# Patient Record
Sex: MALE | Race: WHITE | NOT HISPANIC OR LATINO
[De-identification: names, ages, dates, MRNs, and addresses within clinical notes are randomized per-mention and may not be internally consistent; named-entity substitution may affect disease eponyms.]

---

## 2017-06-15 PROBLEM — Z00.00 ENCOUNTER FOR PREVENTIVE HEALTH EXAMINATION: Status: ACTIVE | Noted: 2017-06-15

## 2017-08-11 ENCOUNTER — APPOINTMENT (OUTPATIENT)
Dept: CARDIOLOGY | Facility: CLINIC | Age: 57
End: 2017-08-11
Payer: COMMERCIAL

## 2017-08-11 ENCOUNTER — NON-APPOINTMENT (OUTPATIENT)
Age: 57
End: 2017-08-11

## 2017-08-11 VITALS
HEART RATE: 57 BPM | OXYGEN SATURATION: 98 % | BODY MASS INDEX: 35.01 KG/M2 | HEIGHT: 68 IN | WEIGHT: 231 LBS | SYSTOLIC BLOOD PRESSURE: 144 MMHG | DIASTOLIC BLOOD PRESSURE: 87 MMHG

## 2017-08-11 DIAGNOSIS — E66.9 OBESITY, UNSPECIFIED: ICD-10-CM

## 2017-08-11 DIAGNOSIS — E78.5 HYPERLIPIDEMIA, UNSPECIFIED: ICD-10-CM

## 2017-08-11 DIAGNOSIS — I10 ESSENTIAL (PRIMARY) HYPERTENSION: ICD-10-CM

## 2017-08-11 DIAGNOSIS — R73.03 PREDIABETES.: ICD-10-CM

## 2017-08-11 PROCEDURE — 99204 OFFICE O/P NEW MOD 45 MIN: CPT

## 2017-08-11 PROCEDURE — 93000 ELECTROCARDIOGRAM COMPLETE: CPT

## 2017-08-11 RX ORDER — ASPIRIN 81 MG/1
81 TABLET, CHEWABLE ORAL
Qty: 90 | Refills: 3 | Status: ACTIVE | COMMUNITY
Start: 2017-08-11

## 2017-08-11 RX ORDER — LISINOPRIL 20 MG/1
20 TABLET ORAL
Qty: 90 | Refills: 0 | Status: ACTIVE | COMMUNITY
Start: 2017-04-11

## 2017-08-11 RX ORDER — TESTOSTERONE 30 MG/1.5ML
30 SOLUTION TOPICAL
Qty: 540 | Refills: 0 | Status: ACTIVE | COMMUNITY
Start: 2017-08-03

## 2017-08-11 RX ORDER — SIMVASTATIN 20 MG/1
20 TABLET, FILM COATED ORAL
Qty: 90 | Refills: 0 | Status: ACTIVE | COMMUNITY
Start: 2017-04-11

## 2017-09-15 ENCOUNTER — APPOINTMENT (OUTPATIENT)
Dept: CARDIOLOGY | Facility: CLINIC | Age: 57
End: 2017-09-15

## 2019-03-05 ENCOUNTER — NON-APPOINTMENT (OUTPATIENT)
Age: 59
End: 2019-03-05

## 2019-03-05 ENCOUNTER — APPOINTMENT (OUTPATIENT)
Dept: CARDIOLOGY | Facility: CLINIC | Age: 59
End: 2019-03-05
Payer: COMMERCIAL

## 2019-03-05 VITALS
HEART RATE: 56 BPM | BODY MASS INDEX: 35.61 KG/M2 | DIASTOLIC BLOOD PRESSURE: 90 MMHG | OXYGEN SATURATION: 99 % | SYSTOLIC BLOOD PRESSURE: 144 MMHG | WEIGHT: 235 LBS | HEIGHT: 68 IN

## 2019-03-05 DIAGNOSIS — R00.2 PALPITATIONS: ICD-10-CM

## 2019-03-05 PROCEDURE — 99214 OFFICE O/P EST MOD 30 MIN: CPT

## 2019-03-05 PROCEDURE — 93000 ELECTROCARDIOGRAM COMPLETE: CPT

## 2019-03-11 ENCOUNTER — APPOINTMENT (OUTPATIENT)
Dept: CARDIOLOGY | Facility: CLINIC | Age: 59
End: 2019-03-11
Payer: COMMERCIAL

## 2019-03-11 PROCEDURE — 93306 TTE W/DOPPLER COMPLETE: CPT

## 2019-04-05 ENCOUNTER — APPOINTMENT (OUTPATIENT)
Dept: CARDIOLOGY | Facility: CLINIC | Age: 59
End: 2019-04-05
Payer: COMMERCIAL

## 2019-04-05 PROCEDURE — 93015 CV STRESS TEST SUPVJ I&R: CPT

## 2021-02-18 ENCOUNTER — TRANSCRIPTION ENCOUNTER (OUTPATIENT)
Age: 61
End: 2021-02-18

## 2021-02-20 ENCOUNTER — OUTPATIENT (OUTPATIENT)
Dept: OUTPATIENT SERVICES | Facility: HOSPITAL | Age: 61
LOS: 1 days | End: 2021-02-20
Payer: COMMERCIAL

## 2021-02-20 ENCOUNTER — APPOINTMENT (OUTPATIENT)
Dept: DISASTER EMERGENCY | Facility: HOSPITAL | Age: 61
End: 2021-02-20

## 2021-02-20 VITALS
SYSTOLIC BLOOD PRESSURE: 161 MMHG | OXYGEN SATURATION: 98 % | HEIGHT: 68 IN | DIASTOLIC BLOOD PRESSURE: 97 MMHG | RESPIRATION RATE: 18 BRPM | WEIGHT: 205.03 LBS | HEART RATE: 70 BPM | TEMPERATURE: 98 F

## 2021-02-20 VITALS
RESPIRATION RATE: 18 BRPM | OXYGEN SATURATION: 98 % | TEMPERATURE: 98 F | DIASTOLIC BLOOD PRESSURE: 98 MMHG | HEART RATE: 59 BPM | SYSTOLIC BLOOD PRESSURE: 156 MMHG

## 2021-02-20 DIAGNOSIS — U07.1 COVID-19: ICD-10-CM

## 2021-02-20 PROCEDURE — M0239: CPT

## 2021-02-20 RX ORDER — SODIUM CHLORIDE 9 MG/ML
250 INJECTION INTRAMUSCULAR; INTRAVENOUS; SUBCUTANEOUS
Refills: 0 | Status: DISCONTINUED | OUTPATIENT
Start: 2021-02-20 | End: 2021-03-07

## 2021-02-20 RX ORDER — BAMLANIVIMAB 35 MG/ML
700 INJECTION, SOLUTION INTRAVENOUS ONCE
Refills: 0 | Status: COMPLETED | OUTPATIENT
Start: 2021-02-20 | End: 2021-02-20

## 2021-02-20 RX ADMIN — BAMLANIVIMAB 270 MILLIGRAM(S): 35 INJECTION, SOLUTION INTRAVENOUS at 12:37

## 2021-02-20 RX ADMIN — SODIUM CHLORIDE 25 MILLILITER(S): 9 INJECTION INTRAMUSCULAR; INTRAVENOUS; SUBCUTANEOUS at 12:37

## 2021-02-20 NOTE — CHART NOTE - NSCHARTNOTEFT_GEN_A_CORE
CC: Monoclonal Antibody Infusion/COVID 19 Positive  61y Male  and recent dx of COVID 19+ who presents today for elective Bamlanivimab infusion.  exam/findings:  T(C): 36.8 (02-20-21 @ 12:11), Max: 36.8 (02-20-21 @ 12:11)  HR: 70 (02-20-21 @ 12:11) (70 - 70)  BP: 161/97 (02-20-21 @ 12:11) (161/97 - 161/97)  RR: 18 (02-20-21 @ 12:11) (18 - 18)  SpO2: 98% (02-20-21 @ 12:11) (98% - 98%)      PE:   Appearance: NAD	  HEENT:   Normal oral mucosa,   Lymphatic: No lymphadenopathy  Cardiovascular: Normal S1 S2, No JVD, No murmurs, No edema  Respiratory: Lungs clear to auscultation	  Gastrointestinal:  Soft, Non-tender, + BS	  Skin: warm and dry, no rash or urticaria   Neurologic: Non-focal  Extremities: Normal range of motion,    ASSESSMENT:  Pt is a 60 yo male who have symptoms of cough,malaise  Covid +on 2/14  referred by Dr. Barbara Paul who presents to infusion center for elective Monoclonal antibody infusion (Bamlanivimab).    Symptoms/ Criteria: Cough,malaise  Risk Profile includes: Age >55, HTN    PLAN:  - infusion procedure explained to patient   - Consent for monoclonal antibody infusion obtained I have reviewed the Bamlanivimab Emergency Use Authorization (EUA) and I have provided the patient or patient's caregiver with the following information:      1. FDA has authorized emergency use of Bamlanivimab, which is not an FDA-approved biological product.  2. The patient or patient's caregiver has the option to accept or refuse administration of Bamlanivimab.  3. The significant known and potential risks and benefits of Bamlanivimab and the extent to which such risks and benefits are unknown.  4. Information on available alternative treatments were discussed and recommended to visit https://www.zpehu48xyhyiljqeqmcboggfiy.nih.gov/ for further understanding    - Risk & benefits discussed/all questions answered  - infuse Bamlanivimab (700mg) IV over one hour  - observe patient for one hour post infusion, and then if stable discharge home with oupt follow up as planned by PMD. CC: Monoclonal Antibody Infusion/COVID 19 Positive  61y Male  and recent dx of COVID 19+ who presents today for elective Bamlanivimab infusion.  exam/findings:  T(C): 36.8 (02-20-21 @ 12:11), Max: 36.8 (02-20-21 @ 12:11)  HR: 70 (02-20-21 @ 12:11) (70 - 70)  BP: 161/97 (02-20-21 @ 12:11) (161/97 - 161/97)  RR: 18 (02-20-21 @ 12:11) (18 - 18)  SpO2: 98% (02-20-21 @ 12:11) (98% - 98%)      PE:   Appearance: NAD	  HEENT:   Normal oral mucosa,   Lymphatic: No lymphadenopathy  Cardiovascular: Normal S1 S2, No JVD, No murmurs, No edema  Respiratory: Lungs clear to auscultation	  Gastrointestinal:  Soft, Non-tender, + BS	  Skin: warm and dry, no rash or urticaria   Neurologic: Non-focal  Extremities: Normal range of motion,    ASSESSMENT:  Pt is a 62 yo male who have symptoms of cough,malaise  Covid +on 2/14  referred by Dr. Brabara Paul who presents to infusion center for elective Monoclonal antibody infusion (Bamlanivimab).    Symptoms/ Criteria: Cough,malaise  Risk Profile includes: Age >55, HTN    PLAN:  - infusion procedure explained to patient   - Consent for monoclonal antibody infusion obtained I have reviewed the Bamlanivimab Emergency Use Authorization (EUA) and I have provided the patient or patient's caregiver with the following information:      1. FDA has authorized emergency use of Bamlanivimab, which is not an FDA-approved biological product.  2. The patient or patient's caregiver has the option to accept or refuse administration of Bamlanivimab.  3. The significant known and potential risks and benefits of Bamlanivimab and the extent to which such risks and benefits are unknown.  4. Information on available alternative treatments were discussed and recommended to visit https://www.kwhjp51cygkxfemyddbkhgtyph.nih.gov/ for further understanding    - Risk & benefits discussed/all questions answered  - infuse Bamlanivimab (700mg) IV over one hour  - observe patient for one hour post infusion, and then if stable discharge home with oupt follow up as planned by PMD.    ADDENDUM    Patient is cleared for discharge at 14:50 pm. He tolerated full infusion well and denies complaints of chest pain, shortness of breath, nausea/vomiting, dizziness, or palpitations. Vital signs stable upon discharge. Patient is medically stable and cleared for discharge home. Discharge instructions provided to patient with fact sheet included. Patient was instructed to continue to self-isolate and use  infection control measures (e.g., wear mask, isolate, social distance, avoid sharing personal items, clean and disinfect “high touch” surfaces, and frequent handwashing) according to CDC guidelines. Patient instructed to follow up with PMD as needed.

## 2021-02-21 ENCOUNTER — TRANSCRIPTION ENCOUNTER (OUTPATIENT)
Age: 61
End: 2021-02-21

## 2022-05-11 ENCOUNTER — APPOINTMENT (OUTPATIENT)
Dept: ORTHOPEDIC SURGERY | Facility: CLINIC | Age: 62
End: 2022-05-11
Payer: COMMERCIAL

## 2022-05-11 VITALS — HEIGHT: 68 IN | WEIGHT: 235 LBS | BODY MASS INDEX: 35.61 KG/M2

## 2022-05-11 PROCEDURE — J3490M: CUSTOM

## 2022-05-11 PROCEDURE — 20610 DRAIN/INJ JOINT/BURSA W/O US: CPT | Mod: LT

## 2022-05-11 PROCEDURE — 99214 OFFICE O/P EST MOD 30 MIN: CPT | Mod: 25

## 2022-05-11 NOTE — ASSESSMENT
[FreeTextEntry1] : ***s/p L knee PMM/PLM on 5/21/21 **\par (**has some OA**)\par \par  exacb undeyrlgin OA\par \par - We discussed their diagnosis and treatment options at length including surgical and non-surgical options.\par - We will continue conservative treatment with activity modification, PT, icing, weight loss, and anti-inflammatory medications.\par - The patient was provided with a PT prescription to work on ROM, hip ER/abductors strengthening, quad/hamstring stretches and strengthening, and other exercises \par - The patient was advised to let pain guide the gradual advancement of activities. \par - We also discussed the possible of a corticosteroid injection in order to help decrease inflammation and pain so that they can perform better therapy.\par - The risks, benefits, and alternatives to corticosteroid injection were reviewed with the patient and they wished to proceed with this treatment course. \par - Follow up as needed in 6 weeks to re-evaluate, if no improvement we spoke about possibility of viscosupplementation injections - will resubmit for auth\par

## 2022-05-11 NOTE — HISTORY OF PRESENT ILLNESS
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 His pain is medial and anterior and worse with WB activites and better at rest. Assoc with some buckling and clicking along with swelling in knee. He has been seeing Dr. Vázquez and tx with ice, nsaids, CSI with mild reief. \par \par 12/2/20 - since I saw he did PT with some relief4 but then pain has returned and worse with acitivty\par 1/13/21 - doing PT but still having some catching and pain both medial and lateral along with achiness\par 3/10/21 - cont to have medial pain and [id number] in left knee, some lateral pain\par \par ***s/p L knee PMM/PLM on 5/21/21 (**has some OA**)***\par \par 5/25/21 - post op visit, doing well, pain well controlled\par 7/13/21 - doing PT with improvement\par 11/2/21 - was doing well after PT but recently knee pain worsening with [id number]\par 12/7/21 - wants to discuss visco, had CSI (11/2)\par 12/14/21 - left euflexxa #2\par 12/21/21 - left euflexxa #3\par \par 3/8/22 - was doing well wityh visco , did PT but then fell onto knee during snowstorm.\par \par 5/11/2022- returns to care with some continued left knee pain medially, anteiror and deep worse with activity

## 2022-05-11 NOTE — IMAGING
[de-identified] : \par RIGHT KNEE\par Inspection:  minimal effusion\par Palpation: medial facet of the patella tenderness \par Knee Range of Motion:  0-140\par Strength: 5/5 Quadriceps strength, 5/5 Hamstring strength, 4/5 Hip Abductor strength\par Neurological: light touch is intact throughout\par Ligament Stability and Special Tests: \par McMurrays: neg\par Lachman: neg\par Pivot Shift: neg\par Posterior Drawer: neg\par Valgus: neg\par Varus: neg\par Patella Apprehension: neg\par Patella Maltracking: neg\par \par \par LEFT KNEE\par Inspection:  mild effusion\par Palpation: medial joint line tenderness, anterior tenderness\par Knee Range of Motion:  3-125 \par Strength: 5/5 Quadriceps strength, 5/5 Hamstring strength\par Neurological: light touch is intact throughout\par Ligament Stability and Special Tests: \par McMurrays: neg\par Lachman: neg\par Pivot Shift: neg\par Posterior Drawer: neg\par Valgus: neg\par Varus: neg\par Patella Apprehension: neg\par Patella Maltracking: neg\par

## 2022-05-11 NOTE — REASON FOR VISIT
[FreeTextEntry2] : left knee
Essential hypertension

## 2022-05-13 RX ORDER — HYALURONATE SODIUM 20 MG/2 ML
20 SYRINGE (ML) INTRAARTICULAR
Qty: 3 | Refills: 0 | Status: ACTIVE | COMMUNITY
Start: 2022-05-13 | End: 1900-01-01

## 2022-06-28 ENCOUNTER — APPOINTMENT (OUTPATIENT)
Dept: ORTHOPEDIC SURGERY | Facility: CLINIC | Age: 62
End: 2022-06-28
Payer: COMMERCIAL

## 2022-06-28 VITALS — HEIGHT: 68 IN | BODY MASS INDEX: 35.61 KG/M2 | WEIGHT: 235 LBS

## 2022-06-28 PROCEDURE — 99214 OFFICE O/P EST MOD 30 MIN: CPT | Mod: 25

## 2022-06-28 PROCEDURE — 20610 DRAIN/INJ JOINT/BURSA W/O US: CPT

## 2022-06-28 NOTE — ASSESSMENT
[FreeTextEntry1] : ***s/p L knee PMM/PLM on 5/21/21 **\par (**has some OA**)\par \par  exacb undeyrlgin OA\par \par - We discussed their diagnosis and treatment options at length including surgical and non-surgical options.\par - We will continue conservative treatment with activity modification, PT, icing, weight loss, and anti-inflammatory medications.\par - The patient was provided with a PT prescription to work on ROM, hip ER/abductors strengthening, quad/hamstring stretches and strengthening, and other exercises \par - The patient was advised to let pain guide the gradual advancement of activities. \par - we spoke about possibility of viscosupplementation injections and they want to proceed\par - left knee euflexxa #1 given, raysa well\par

## 2022-06-28 NOTE — HISTORY OF PRESENT ILLNESS
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 His pain is medial and anterior and worse with WB activites and better at rest. Assoc with some buckling and clicking along with swelling in knee. He has been seeing Dr. Vázquez and tx with ice, nsaids, CSI with mild reief. \par \par 12/2/20 - since I saw he did PT with some relief4 but then pain has returned and worse with acitivty\par 1/13/21 - doing PT but still having some catching and pain both medial and lateral along with achiness\par 3/10/21 - cont to have medial pain and [id number] in left knee, some lateral pain\par \par ***s/p L knee PMM/PLM on 5/21/21 (**has some OA**)***\par \par 5/25/21 - post op visit, doing well, pain well controlled\par 7/13/21 - doing PT with improvement\par 11/2/21 - was doing well after PT but recently knee pain worsening with [id number]\par 12/7/21 - wants to discuss visco, had CSI (11/2)\par 12/14/21 - left euflexxa #2\par 12/21/21 - left euflexxa #3\par \par 3/8/22 - was doing well wityh visco , did PT but then fell onto knee during snowstorm.\par 5/11/2022- returns to care with some continued left knee pain medially, anteiror and deep worse with activity\par 6/28/22 - had CSI (5/11), wants to discuss poss visco

## 2022-06-28 NOTE — PROCEDURE
[FreeTextEntry3] : \par Injection Procedure Note:\par \par The risks, benefits, and alternatives to viscosupplementation injection were reviewed with the patient. Risks outlined include but are not limited to infection, sepsis, bleeding, scarring, temporary increase in pain, syncopal episode, failure to resolve symptoms, symptoms recurrence, allergic reaction, flare reaction, pseudoseptic reaction.  Patient understood the risks and asked to proceed with this treatment course.\par \par Patient Identification\par Name/: Verbal with patient and/or family\par \par Procedure Verification:\par Procedure confirmed with patient or family/designee\par Consent for procedure: Verbal Consent Given\par Relevant documentation completed, reviewed, and signed\par Clinical indications for procedure confirmed\par \par Time-out with all members of procedure team immediately prior to procedure:\par Correct patient identified. Agreement on procedure. Correct side and site.\par \par \par KNEE INJECTION (Vis[facility]) - LEFT\par After verbal consent and identification of the correct patient and correct site, the left knee were prepped using alcohol swabs and betadine. This was allowed time to air dry. \par An injection of EUFLEXXA 2ml  #1 \par was injected into the knee using a sterile 22G needle after ethyl chloride spray for skin anesthesia.  The patient tolerated the procedure well. After-care instructions were provided and included instructions to ice the area and to call if redness, pain, or fever develop.\par

## 2022-06-28 NOTE — IMAGING
[de-identified] : \par RIGHT KNEE\par Inspection:  minimal effusion\par Palpation: medial facet of the patella tenderness \par Knee Range of Motion:  0-140\par Strength: 5/5 Quadriceps strength, 5/5 Hamstring strength, 4/5 Hip Abductor strength\par Neurological: light touch is intact throughout\par Ligament Stability and Special Tests: \par McMurrays: neg\par Lachman: neg\par Pivot Shift: neg\par Posterior Drawer: neg\par Valgus: neg\par Varus: neg\par Patella Apprehension: neg\par Patella Maltracking: neg\par \par \par LEFT KNEE\par Inspection:  mild effusion\par Palpation: medial joint line tenderness, anterior tenderness\par Knee Range of Motion:  3-125 \par Strength: 5/5 Quadriceps strength, 5/5 Hamstring strength\par Neurological: light touch is intact throughout\par Ligament Stability and Special Tests: \par McMurrays: neg\par Lachman: neg\par Pivot Shift: neg\par Posterior Drawer: neg\par Valgus: neg\par Varus: neg\par Patella Apprehension: neg\par Patella Maltracking: neg\par

## 2022-07-05 ENCOUNTER — APPOINTMENT (OUTPATIENT)
Dept: ORTHOPEDIC SURGERY | Facility: CLINIC | Age: 62
End: 2022-07-05
Payer: COMMERCIAL

## 2022-07-05 PROCEDURE — 20610 DRAIN/INJ JOINT/BURSA W/O US: CPT | Mod: LT

## 2022-07-05 PROCEDURE — 99212 OFFICE O/P EST SF 10 MIN: CPT | Mod: 25

## 2022-07-05 NOTE — HISTORY OF PRESENT ILLNESS
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 His pain is medial and anterior and worse with WB activites and better at rest. Assoc with some buckling and clicking along with swelling in knee. He has been seeing Dr. Vázquez and tx with ice, nsaids, CSI with mild reief. \par \par 12/2/20 - since I saw he did PT with some relief4 but then pain has returned and worse with acitivty\par 1/13/21 - doing PT but still having some catching and pain both medial and lateral along with achiness\par 3/10/21 - cont to have medial pain and [id number] in left knee, some lateral pain\par \par ***s/p L knee PMM/PLM on 5/21/21 (**has some OA**)***\par \par 5/25/21 - post op visit, doing well, pain well controlled\par 7/13/21 - doing PT with improvement\par 11/2/21 - was doing well after PT but recently knee pain worsening with [id number]\par 12/7/21 - wants to discuss visco, had CSI (11/2)\par 12/14/21 - left euflexxa #2\par 12/21/21 - left euflexxa #3\par \par 3/8/22 - was doing well wityh visco , did PT but then fell onto knee during snowstorm.\par 5/11/2022- returns to care with some continued left knee pain medially, anteiror and deep worse with activity\par 6/28/22 - had CSI (5/11), wants to discuss poss visco\par 7/5/22 - left euflexxa #2

## 2022-07-05 NOTE — PROCEDURE
[FreeTextEntry3] : \par Injection Procedure Note:\par \par The risks, benefits, and alternatives to viscosupplementation injection were reviewed with the patient. Risks outlined include but are not limited to infection, sepsis, bleeding, scarring, temporary increase in pain, syncopal episode, failure to resolve symptoms, symptoms recurrence, allergic reaction, flare reaction, pseudoseptic reaction.  Patient understood the risks and asked to proceed with this treatment course.\par \par Patient Identification\par Name/: Verbal with patient and/or family\par \par Procedure Verification:\par Procedure confirmed with patient or family/designee\par Consent for procedure: Verbal Consent Given\par Relevant documentation completed, reviewed, and signed\par Clinical indications for procedure confirmed\par \par Time-out with all members of procedure team immediately prior to procedure:\par Correct patient identified. Agreement on procedure. Correct side and site.\par \par \par KNEE INJECTION (Visco) - LEFT\par After verbal consent and identification of the correct patient and correct site, the left knee were prepped using alcohol swabs and betadine. This was allowed time to air dry. \par An injection of EUFLEXXA 2ml  #2\par was injected into the knee using a sterile 22G needle after ethyl chloride spray for skin anesthesia.  The patient tolerated the procedure well. After-care instructions were provided and included instructions to ice the area and to call if redness, pain, or fever develop.\par

## 2022-07-05 NOTE — ASSESSMENT
[FreeTextEntry1] : ***s/p L knee PMM/PLM on 5/21/21 **\par (**has some OA**)\par \par  exacb undeyrlgin OA\par \par - We discussed their diagnosis and treatment options at length including surgical and non-surgical options.\par - We will continue conservative treatment with activity modification, PT, icing, weight loss, and anti-inflammatory medications.\par - The patient was provided with a PT prescription to work on ROM, hip ER/abductors strengthening, quad/hamstring stretches and strengthening, and other exercises \par - The patient was advised to let pain guide the gradual advancement of activities. \par - we spoke about possibility of viscosupplementation injections and they want to proceed\par - left knee euflexxa #2 given, raysa well\par

## 2022-07-05 NOTE — IMAGING
[de-identified] : \par RIGHT KNEE\par Inspection:  minimal effusion\par Palpation: medial facet of the patella tenderness \par Knee Range of Motion:  0-140\par Strength: 5/5 Quadriceps strength, 5/5 Hamstring strength, 4/5 Hip Abductor strength\par Neurological: light touch is intact throughout\par Ligament Stability and Special Tests: \par McMurrays: neg\par Lachman: neg\par Pivot Shift: neg\par Posterior Drawer: neg\par Valgus: neg\par Varus: neg\par Patella Apprehension: neg\par Patella Maltracking: neg\par \par \par LEFT KNEE\par Inspection:  mild effusion\par Palpation: medial joint line tenderness, anterior tenderness\par Knee Range of Motion:  3-125 \par Strength: 5/5 Quadriceps strength, 5/5 Hamstring strength\par Neurological: light touch is intact throughout\par Ligament Stability and Special Tests: \par McMurrays: neg\par Lachman: neg\par Pivot Shift: neg\par Posterior Drawer: neg\par Valgus: neg\par Varus: neg\par Patella Apprehension: neg\par Patella Maltracking: neg\par

## 2022-07-12 ENCOUNTER — APPOINTMENT (OUTPATIENT)
Dept: ORTHOPEDIC SURGERY | Facility: CLINIC | Age: 62
End: 2022-07-12

## 2022-07-12 PROCEDURE — 99212 OFFICE O/P EST SF 10 MIN: CPT | Mod: 25

## 2022-07-12 PROCEDURE — 20610 DRAIN/INJ JOINT/BURSA W/O US: CPT

## 2022-07-12 NOTE — HISTORY OF PRESENT ILLNESS
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 His pain is medial and anterior and worse with WB activites and better at rest. Assoc with some buckling and clicking along with swelling in knee. He has been seeing Dr. Vázquez and tx with ice, nsaids, CSI with mild reief. \par \par 12/2/20 - since I saw he did PT with some relief4 but then pain has returned and worse with acitivty\par 1/13/21 - doing PT but still having some catching and pain both medial and lateral along with achiness\par 3/10/21 - cont to have medial pain and [id number] in left knee, some lateral pain\par \par ***s/p L knee PMM/PLM on 5/21/21 (**has some OA**)***\par \par 5/25/21 - post op visit, doing well, pain well controlled\par 7/13/21 - doing PT with improvement\par 11/2/21 - was doing well after PT but recently knee pain worsening with [id number]\par 12/7/21 - wants to discuss visco, had CSI (11/2)\par 12/14/21 - left euflexxa #2\par 12/21/21 - left euflexxa #3\par \par 3/8/22 - was doing well wityh visco , did PT but then fell onto knee during snowstorm.\par 5/11/2022- returns to care with some continued left knee pain medially, anteiror and deep worse with activity\par 6/28/22 - had CSI (5/11), wants to discuss poss visco\par 7/5/22 - left euflexxa #2\par 7/12/22 - left eulfexxa #3

## 2022-07-12 NOTE — PROCEDURE
[FreeTextEntry3] : \par Injection Procedure Note:\par \par The risks, benefits, and alternatives to viscosupplementation injection were reviewed with the patient. Risks outlined include but are not limited to infection, sepsis, bleeding, scarring, temporary increase in pain, syncopal episode, failure to resolve symptoms, symptoms recurrence, allergic reaction, flare reaction, pseudoseptic reaction.  Patient understood the risks and asked to proceed with this treatment course.\par \par Patient Identification\par Name/: Verbal with patient and/or family\par \par Procedure Verification:\par Procedure confirmed with patient or family/designee\par Consent for procedure: Verbal Consent Given\par Relevant documentation completed, reviewed, and signed\par Clinical indications for procedure confirmed\par \par Time-out with all members of procedure team immediately prior to procedure:\par Correct patient identified. Agreement on procedure. Correct side and site.\par \par \par KNEE INJECTION (Vis[facility]) - LEFT\par After verbal consent and identification of the correct patient and correct site, the left knee were prepped using alcohol swabs and betadine. This was allowed time to air dry. \par An injection of EUFLEXXA 2ml  #3\par was injected into the knee using a sterile 22G needle after ethyl chloride spray for skin anesthesia.  The patient tolerated the procedure well. After-care instructions were provided and included instructions to ice the area and to call if redness, pain, or fever develop.\par

## 2022-07-12 NOTE — IMAGING
[de-identified] : \par RIGHT KNEE\par Inspection:  minimal effusion\par Palpation: medial facet of the patella tenderness \par Knee Range of Motion:  0-140\par Strength: 5/5 Quadriceps strength, 5/5 Hamstring strength, 4/5 Hip Abductor strength\par Neurological: light touch is intact throughout\par Ligament Stability and Special Tests: \par McMurrays: neg\par Lachman: neg\par Pivot Shift: neg\par Posterior Drawer: neg\par Valgus: neg\par Varus: neg\par Patella Apprehension: neg\par Patella Maltracking: neg\par \par \par LEFT KNEE\par Inspection:  mild effusion\par Palpation: medial joint line tenderness, anterior tenderness\par Knee Range of Motion:  3-125 \par Strength: 5/5 Quadriceps strength, 5/5 Hamstring strength\par Neurological: light touch is intact throughout\par Ligament Stability and Special Tests: \par McMurrays: neg\par Lachman: neg\par Pivot Shift: neg\par Posterior Drawer: neg\par Valgus: neg\par Varus: neg\par Patella Apprehension: neg\par Patella Maltracking: neg\par

## 2022-07-12 NOTE — ASSESSMENT
[FreeTextEntry1] : ***s/p L knee PMM/PLM on 5/21/21 **\par (**has some OA**)\par \par  exacb undeyrlgin OA\par \par - We discussed their diagnosis and treatment options at length including surgical and non-surgical options.\par - We will continue conservative treatment with activity modification, PT, icing, weight loss, and anti-inflammatory medications.\par - The patient was provided with a PT prescription to work on ROM, hip ER/abductors strengthening, quad/hamstring stretches and strengthening, and other exercises \par - The patient was advised to let pain guide the gradual advancement of activities. \par - we spoke about possibility of viscosupplementation injections and they want to proceed\par - left knee euflexxa #3 given, raysa well\par

## 2022-07-25 ENCOUNTER — APPOINTMENT (OUTPATIENT)
Dept: ORTHOPEDIC SURGERY | Facility: CLINIC | Age: 62
End: 2022-07-25

## 2022-07-25 VITALS
DIASTOLIC BLOOD PRESSURE: 101 MMHG | BODY MASS INDEX: 32.58 KG/M2 | SYSTOLIC BLOOD PRESSURE: 167 MMHG | WEIGHT: 215 LBS | HEIGHT: 68 IN | HEART RATE: 60 BPM

## 2022-07-25 DIAGNOSIS — M17.12 UNILATERAL PRIMARY OSTEOARTHRITIS, LEFT KNEE: ICD-10-CM

## 2022-07-25 PROCEDURE — 99203 OFFICE O/P NEW LOW 30 MIN: CPT

## 2022-07-25 PROCEDURE — 73562 X-RAY EXAM OF KNEE 3: CPT | Mod: LT

## 2022-07-25 NOTE — DISCUSSION/SUMMARY
[de-identified] : Discussion had with the patient.  I reviewed the arthroscopic surgery report of the left knee.  He had grade 2 wear changes in the medial compartment and grade 3-4 changes in the patellofemoral compartment.  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  He underwent a partial medial meniscectomy.  He experiences pain and stiffness of his left knee.  He is unable to run.  Recommend continued nonsurgical care.  I explained to the patient that the symptoms he is experiencing are to be expected with his degree of osteoarthritis.  Symptomatic management can include the use of NSAIDs, cryotherapy, knee sleeve for bracing and activity modification when needed.  Additional treatment could include the use of viscosupplementation injections or Biologics with PRP.  Should his condition progress in the future he may require a knee replacement.  I have recommended follow-up in 6 months.

## 2022-07-25 NOTE — HISTORY OF PRESENT ILLNESS
[de-identified] : Mr. OLYA HENDRIX is a 62 year male who presents to office complaining of left knee pain since 2019 that has been worsening recently since running summer 2020. He has followed up with Dr. Michael Nettles for this issue most recently on 7/12/22. His pain is medial and anterior and worse with weightbearing activities and better at rest. Associated with some buckling and clicking along with swelling in knee. \par Patient has had left knee partial medial and lateral meniscectomies on 5/21/21.\par He has done NSAIDs, PT, cortisone injections (most recently on 6/28/22), and just completed a series of Euflexxa injection on 7/12/22. \par \par All review of systems, family history, social history, surgical history, past medical history, medications, and allergies not previously stated as positive are negative. They were reviewed by me today with the patient and documented accordingly.

## 2022-07-25 NOTE — PHYSICAL EXAM
[LE] : Sensory: Intact in bilateral lower extremities [DP] : dorsalis pedis 2+ and symmetric bilaterally [de-identified] : Walking well without assistive aids.  Equal leg lengths.  Neutral alignment left knee.  Small effusion left knee joint.  No redness.  Left knee active motion 0 to 125 degrees.  Crepitus with knee flexion.  Tenderness medial joint line.  Left knee is stable with varus/valgus and drawer testing.  No calf tenderness.  Right knee: Alignment normal.  No warmth.  No swelling.  Pain-free active motion [de-identified] : X-rays left knee AP weightbearing, lateral and patella sunrise views taken the office today demonstrate Kellgren-Trace grade 2 changes with mild narrowing medial joint space and small marginal osteophytes.

## 2023-04-27 ENCOUNTER — APPOINTMENT (OUTPATIENT)
Dept: ORTHOPEDIC SURGERY | Facility: CLINIC | Age: 63
End: 2023-04-27
Payer: COMMERCIAL

## 2023-04-27 VITALS — BODY MASS INDEX: 32.58 KG/M2 | WEIGHT: 215 LBS | HEIGHT: 68 IN

## 2023-04-27 PROCEDURE — 99214 OFFICE O/P EST MOD 30 MIN: CPT | Mod: 25

## 2023-04-27 PROCEDURE — 20610 DRAIN/INJ JOINT/BURSA W/O US: CPT | Mod: LT

## 2023-04-27 PROCEDURE — J3490M: CUSTOM

## 2023-04-27 PROCEDURE — 73564 X-RAY EXAM KNEE 4 OR MORE: CPT | Mod: LT

## 2023-04-27 NOTE — IMAGING
[de-identified] : \par RIGHT KNEE\par Inspection:  minimal effusion\par Palpation: medial facet of the patella tenderness \par Knee Range of Motion:  0-140\par Strength: 5/5 Quadriceps strength, 5/5 Hamstring strength, 4/5 Hip Abductor strength\par Neurological: light touch is intact throughout\par Ligament Stability and Special Tests: \par McMurrays: neg\par Lachman: neg\par Pivot Shift: neg\par Posterior Drawer: neg\par Valgus: neg\par Varus: neg\par Patella Apprehension: neg\par Patella Maltracking: neg\par \par \par LEFT KNEE\par Inspection:  mild effusion\par Palpation: medial joint line tenderness, anterior tenderness\par Knee Range of Motion:  3-125 \par Strength: 5/5 Quadriceps strength, 5/5 Hamstring strength\par Neurological: light touch is intact throughout\par Ligament Stability and Special Tests: \par McMurrays: neg\par Lachman: neg\par Pivot Shift: neg\par Posterior Drawer: neg\par Valgus: neg\par Varus: neg\par Patella Apprehension: neg\par Patella Maltracking: neg\par

## 2023-04-27 NOTE — HISTORY OF PRESENT ILLNESS
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 His pain is medial and anterior and worse with WB activites and better at rest. Assoc with some buckling and clicking along with swelling in knee. He has been seeing Dr. Vázquez and tx with ice, nsaids, CSI with mild reief. \par \par 12/2/20 - since I saw he did PT with some relief4 but then pain has returned and worse with acitivty\par 1/13/21 - doing PT but still having some catching and pain both medial and lateral along with achiness\par 3/10/21 - cont to have medial pain and [id number] in left knee, some lateral pain\par \par ***s/p L knee PMM/PLM on 5/21/21 (**has some OA**)***\par \par 5/25/21 - post op visit, doing well, pain well controlled\par 7/13/21 - doing PT with improvement\par 11/2/21 - was doing well after PT but recently knee pain worsening with [id number]\par 12/7/21 - wants to discuss visco, had CSI (11/2)\par 12/14/21 - left euflexxa #2\par 12/21/21 - left euflexxa #3\par \par 3/8/22 - was doing well wityh visco , did PT but then fell onto knee during snowstorm.\par 5/11/2022- returns to care with some continued left knee pain medially, anteiror and deep worse with activity\par 6/28/22 - had CSI (5/11), wants to discuss poss visco\par 7/5/22 - left euflexxa #2\par 7/12/22 - left eulfexxa #3\par \par 4/27/23- was doing well until doing house work with friend and aggravated  knee pain worsened 4/26/23

## 2023-05-15 RX ORDER — HYALURONATE SODIUM 20 MG/2 ML
20 SYRINGE (ML) INTRAARTICULAR
Qty: 3 | Refills: 0 | Status: ACTIVE | COMMUNITY
Start: 2023-05-15 | End: 1900-01-01

## 2023-08-15 ENCOUNTER — APPOINTMENT (OUTPATIENT)
Dept: ORTHOPEDIC SURGERY | Facility: CLINIC | Age: 63
End: 2023-08-15
Payer: COMMERCIAL

## 2023-08-15 VITALS — BODY MASS INDEX: 32.58 KG/M2 | WEIGHT: 215 LBS | HEIGHT: 68 IN

## 2023-08-15 PROCEDURE — 20610 DRAIN/INJ JOINT/BURSA W/O US: CPT | Mod: LT

## 2023-08-15 PROCEDURE — 99214 OFFICE O/P EST MOD 30 MIN: CPT | Mod: 25

## 2023-08-15 RX ORDER — NAPROXEN 500 MG/1
500 TABLET ORAL TWICE DAILY
Qty: 60 | Refills: 0 | Status: ACTIVE | COMMUNITY
Start: 2023-08-15 | End: 1900-01-01

## 2023-08-15 NOTE — IMAGING
[de-identified] : \par  RIGHT KNEE\par  Inspection:  minimal effusion\par  Palpation: medial facet of the patella tenderness \par  Knee Range of Motion:  0-140\par  Strength: 5/5 Quadriceps strength, 5/5 Hamstring strength, 4/5 Hip Abductor strength\par  Neurological: light touch is intact throughout\par  Ligament Stability and Special Tests: \par  McMurrays: neg\par  Lachman: neg\par  Pivot Shift: neg\par  Posterior Drawer: neg\par  Valgus: neg\par  Varus: neg\par  Patella Apprehension: neg\par  Patella Maltracking: neg\par  \par  \par  LEFT KNEE\par  Inspection:  mild effusion\par  Palpation: medial joint line tenderness, anterior tenderness\par  Knee Range of Motion:  3-125 \par  Strength: 5/5 Quadriceps strength, 5/5 Hamstring strength\par  Neurological: light touch is intact throughout\par  Ligament Stability and Special Tests: \par  McMurrays: neg\par  Lachman: neg\par  Pivot Shift: neg\par  Posterior Drawer: neg\par  Valgus: neg\par  Varus: neg\par  Patella Apprehension: neg\par  Patella Maltracking: neg\par

## 2023-08-15 NOTE — HISTORY OF PRESENT ILLNESS
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 His pain is medial and anterior and worse with WB activites and better at rest. Assoc with some buckling and clicking along with swelling in knee. He has been seeing Dr. Vázquez and tx with ice, nsaids, CSI with mild reief.   12/2/20 - since I saw he did PT with some relief4 but then pain has returned and worse with acitivty 1/13/21 - doing PT but still having some catching and pain both medial and lateral along with achiness 3/10/21 - cont to have medial pain and [id number] in left knee, some lateral pain  ***s/p L knee PMM/PLM on 5/21/21 (**has some OA**)***  5/25/21 - post op visit, doing well, pain well controlled 7/13/21 - doing PT with improvement 11/2/21 - was doing well after PT but recently knee pain worsening with [id number] 12/7/21 - wants to discuss visco, had CSI (11/2) 12/14/21 - left euflexxa #2 12/21/21 - left euflexxa #3  3/8/22 - was doing well wityh visco , did PT but then fell onto knee during snowstorm. 5/11/2022- returns to care with some continued left knee pain medially, anteiror and deep worse with activity 6/28/22 - had CSI (5/11), wants to discuss poss visco 7/5/22 - left euflexxa #2 7/12/22 - left eulfexxa #3  4/27/23- was doing well until doing house work with friend and aggravated  knee pain worsened 4/26/23 8/15/23 - had relief from CSI (4/27) but continues to have pain. would like to discuss euflexxa injections

## 2023-08-15 NOTE — ASSESSMENT
[FreeTextEntry1] : ***s/p L knee PMM/PLM on 5/21/21 ** (**has some OA**)    exacb underlying OA  - We discussed their diagnosis and treatment options at length including the risks and benefits of both surgical treatment with a knee replacement and non-surgical options. - We will continue conservative treatment with activity modification, PT, icing, weight loss, and anti-inflammatory medications. - The patient was provided with a PT prescription to work on ROM, hip ER/abductors strengthening, quad/hamstring stretches and strengthening, and other exercises  - The patient was advised to let pain guide the gradual advancement of activities.  - We also discussed the possibility of viscosupplementation injections and he would like to proceed - left knee euflexxa #1 - raysa well

## 2023-08-15 NOTE — PROCEDURE
[FreeTextEntry3] :  Injection Procedure Note:  The risks, benefits, and alternatives to viscosupplementation injection were reviewed with the patient. Risks outlined include but are not limited to infection, sepsis, bleeding, scarring, temporary increase in pain, syncopal episode, failure to resolve symptoms, symptoms recurrence, allergic reaction, flare reaction, pseudoseptic reaction.  Patient understood the risks and asked to proceed with this treatment course.  Patient Identification Name/: Verbal with patient and/or family  Procedure Verification: Procedure confirmed with patient or family/designee Consent for procedure: Verbal Consent Given Relevant documentation completed, reviewed, and signed Clinical indications for procedure confirmed  Time-out with all members of procedure team immediately prior to procedure: Correct patient identified. Agreement on procedure. Correct side and site.   KNEE INJECTION (Visco) - LEFT After verbal consent and identification of the correct patient and correct site, the left knee were prepped using alcohol swabs and betadine. This was allowed time to air dry.  An injection of EUFLEXXA 2ml  #1   was injected into the knee using a sterile 22G needle after ethyl chloride spray for skin anesthesia.  The patient tolerated the procedure well. After-care instructions were provided and included instructions to ice the area and to call if redness, pain, or fever develop.

## 2023-08-22 ENCOUNTER — APPOINTMENT (OUTPATIENT)
Dept: ORTHOPEDIC SURGERY | Facility: CLINIC | Age: 63
End: 2023-08-22
Payer: COMMERCIAL

## 2023-08-22 PROCEDURE — 99212 OFFICE O/P EST SF 10 MIN: CPT | Mod: 25

## 2023-08-22 PROCEDURE — 20610 DRAIN/INJ JOINT/BURSA W/O US: CPT | Mod: LT

## 2023-08-22 NOTE — HISTORY OF PRESENT ILLNESS
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 His pain is medial and anterior and worse with WB activites and better at rest. Assoc with some buckling and clicking along with swelling in knee. He has been seeing Dr. Vázquez and tx with ice, nsaids, CSI with mild reief.   12/2/20 - since I saw he did PT with some relief4 but then pain has returned and worse with acitivty 1/13/21 - doing PT but still having some catching and pain both medial and lateral along with achiness 3/10/21 - cont to have medial pain and [id number] in left knee, some lateral pain  ***s/p L knee PMM/PLM on 5/21/21 (**has some OA**)***  5/25/21 - post op visit, doing well, pain well controlled 7/13/21 - doing PT with improvement 11/2/21 - was doing well after PT but recently knee pain worsening with [id number] 12/7/21 - wants to discuss visco, had CSI (11/2) 12/14/21 - left euflexxa #2 12/21/21 - left euflexxa #3  3/8/22 - was doing well wityh visco , did PT but then fell onto knee during snowstorm. 5/11/2022- returns to care with some continued left knee pain medially, anteiror and deep worse with activity 6/28/22 - had CSI (5/11), wants to discuss poss visco 7/5/22 - left euflexxa #2 7/12/22 - left eulfexxa #3  4/27/23- was doing well until doing house work with friend and aggravated  knee pain worsened 4/26/23 8/15/23 - had relief from CSI (4/27) but continues to have pain. would like to discuss euflexxa injections 8/22/23- left knee euflexxa #2

## 2023-08-22 NOTE — PROCEDURE
[FreeTextEntry3] : Injection Procedure Note:  The risks, benefits, and alternatives to viscosupplementation injection were reviewed with the patient. Risks outlined include but are not limited to infection, sepsis, bleeding, scarring, temporary increase in pain, syncopal episode, failure to resolve symptoms, symptoms recurrence, allergic reaction, flare reaction, pseudoseptic reaction.  Patient understood the risks and asked to proceed with this treatment course.  Patient Identification Name/: Verbal with patient and/or family  Procedure Verification: Procedure confirmed with patient or family/designee Consent for procedure: Verbal Consent Given Relevant documentation completed, reviewed, and signed Clinical indications for procedure confirmed  Time-out with all members of procedure team immediately prior to procedure: Correct patient identified. Agreement on procedure. Correct side and site.   KNEE INJECTION (Visco) - LEFT After verbal consent and identification of the correct patient and correct site, the left knee were prepped using alcohol swabs and betadine. This was allowed time to air dry.  An injection of EUFLEXXA 2ml  #2   was injected into the knee using a sterile 22G needle after ethyl chloride spray for skin anesthesia.  The patient tolerated the procedure well. After-care instructions were provided and included instructions to ice the area and to call if redness, pain, or fever develop.

## 2023-08-22 NOTE — IMAGING
[de-identified] : \par  RIGHT KNEE\par  Inspection:  minimal effusion\par  Palpation: medial facet of the patella tenderness \par  Knee Range of Motion:  0-140\par  Strength: 5/5 Quadriceps strength, 5/5 Hamstring strength, 4/5 Hip Abductor strength\par  Neurological: light touch is intact throughout\par  Ligament Stability and Special Tests: \par  McMurrays: neg\par  Lachman: neg\par  Pivot Shift: neg\par  Posterior Drawer: neg\par  Valgus: neg\par  Varus: neg\par  Patella Apprehension: neg\par  Patella Maltracking: neg\par  \par  \par  LEFT KNEE\par  Inspection:  mild effusion\par  Palpation: medial joint line tenderness, anterior tenderness\par  Knee Range of Motion:  3-125 \par  Strength: 5/5 Quadriceps strength, 5/5 Hamstring strength\par  Neurological: light touch is intact throughout\par  Ligament Stability and Special Tests: \par  McMurrays: neg\par  Lachman: neg\par  Pivot Shift: neg\par  Posterior Drawer: neg\par  Valgus: neg\par  Varus: neg\par  Patella Apprehension: neg\par  Patella Maltracking: neg\par

## 2023-08-22 NOTE — ASSESSMENT
[FreeTextEntry1] : ***s/p L knee PMM/PLM on 5/21/21 ** (**has some OA**)    exacb underlying OA  - We discussed their diagnosis and treatment options at length including the risks and benefits of both surgical treatment with a knee replacement and non-surgical options. - We will continue conservative treatment with activity modification, PT, icing, weight loss, and anti-inflammatory medications. - The patient was provided with a PT prescription to work on ROM, hip ER/abductors strengthening, quad/hamstring stretches and strengthening, and other exercises  - The patient was advised to let pain guide the gradual advancement of activities.  - We also discussed the possibility of viscosupplementation injections and he would like to proceed - left knee euflexxa #2 - raysa well

## 2023-08-28 ENCOUNTER — APPOINTMENT (OUTPATIENT)
Dept: ORTHOPEDIC SURGERY | Facility: CLINIC | Age: 63
End: 2023-08-28
Payer: COMMERCIAL

## 2023-08-28 PROCEDURE — 99212 OFFICE O/P EST SF 10 MIN: CPT | Mod: 25

## 2023-08-28 PROCEDURE — 20610 DRAIN/INJ JOINT/BURSA W/O US: CPT | Mod: LT

## 2023-08-28 NOTE — PROCEDURE
[FreeTextEntry3] : Injection Procedure Note:  The risks, benefits, and alternatives to viscosupplementation injection were reviewed with the patient. Risks outlined include but are not limited to infection, sepsis, bleeding, scarring, temporary increase in pain, syncopal episode, failure to resolve symptoms, symptoms recurrence, allergic reaction, flare reaction, pseudoseptic reaction.  Patient understood the risks and asked to proceed with this treatment course.  Patient Identification Name/: Verbal with patient and/or family  Procedure Verification: Procedure confirmed with patient or family/designee Consent for procedure: Verbal Consent Given Relevant documentation completed, reviewed, and signed Clinical indications for procedure confirmed  Time-out with all members of procedure team immediately prior to procedure: Correct patient identified. Agreement on procedure. Correct side and site.   KNEE INJECTION (Visco) - LEFT After verbal consent and identification of the correct patient and correct site, the left knee were prepped using alcohol swabs and betadine. This was allowed time to air dry.  An injection of EUFLEXXA 2ml  #3   was injected into the knee using a sterile 22G needle after ethyl chloride spray for skin anesthesia.  The patient tolerated the procedure well. After-care instructions were provided and included instructions to ice the area and to call if redness, pain, or fever develop.

## 2023-08-28 NOTE — HISTORY OF PRESENT ILLNESS
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 His pain is medial and anterior and worse with WB activites and better at rest. Assoc with some buckling and clicking along with swelling in knee. He has been seeing Dr. Vázquez and tx with ice, nsaids, CSI with mild reief.   12/2/20 - since I saw he did PT with some relief4 but then pain has returned and worse with acitivty 1/13/21 - doing PT but still having some catching and pain both medial and lateral along with achiness 3/10/21 - cont to have medial pain and [id number] in left knee, some lateral pain  ***s/p L knee PMM/PLM on 5/21/21 (**has some OA**)***  5/25/21 - post op visit, doing well, pain well controlled 7/13/21 - doing PT with improvement 11/2/21 - was doing well after PT but recently knee pain worsening with [id number] 12/7/21 - wants to discuss visco, had CSI (11/2) 12/14/21 - left euflexxa #2 12/21/21 - left euflexxa #3  3/8/22 - was doing well wityh visco , did PT but then fell onto knee during snowstorm. 5/11/2022- returns to care with some continued left knee pain medially, anteiror and deep worse with activity 6/28/22 - had CSI (5/11), wants to discuss poss visco 7/5/22 - left euflexxa #2 7/12/22 - left eulfexxa #3  4/27/23- was doing well until doing house work with friend and aggravated  knee pain worsened 4/26/23 8/15/23 - had relief from CSI (4/27) but continues to have pain. would like to discuss euflexxa injections 8/22/23- left knee euflexxa #2 8/28/23- left knee euflexxa #3

## 2023-08-28 NOTE — ASSESSMENT
[FreeTextEntry1] : ***s/p L knee PMM/PLM on 5/21/21 ** (**has some OA**)    exacb underlying OA  - We discussed their diagnosis and treatment options at length including the risks and benefits of both surgical treatment with a knee replacement and non-surgical options. - We will continue conservative treatment with activity modification, PT, icing, weight loss, and anti-inflammatory medications. - The patient was provided with a PT prescription to work on ROM, hip ER/abductors strengthening, quad/hamstring stretches and strengthening, and other exercises  - The patient was advised to let pain guide the gradual advancement of activities.  - We also discussed the possibility of viscosupplementation injections and he would like to proceed - left knee euflexxa #3 - raysa well

## 2023-08-31 ENCOUNTER — APPOINTMENT (OUTPATIENT)
Dept: ORTHOPEDIC SURGERY | Facility: CLINIC | Age: 63
End: 2023-08-31

## 2023-10-02 ENCOUNTER — APPOINTMENT (OUTPATIENT)
Dept: ORTHOPEDIC SURGERY | Facility: CLINIC | Age: 63
End: 2023-10-02
Payer: COMMERCIAL

## 2023-10-02 VITALS — BODY MASS INDEX: 32.58 KG/M2 | HEIGHT: 68 IN | WEIGHT: 215 LBS

## 2023-10-02 DIAGNOSIS — M54.50 LOW BACK PAIN, UNSPECIFIED: ICD-10-CM

## 2023-10-02 DIAGNOSIS — E78.00 PURE HYPERCHOLESTEROLEMIA, UNSPECIFIED: ICD-10-CM

## 2023-10-02 DIAGNOSIS — S39.011A STRAIN OF MUSCLE, FASCIA AND TENDON OF ABDOMEN, INITIAL ENCOUNTER: ICD-10-CM

## 2023-10-02 DIAGNOSIS — M25.551 PAIN IN RIGHT HIP: ICD-10-CM

## 2023-10-02 DIAGNOSIS — I10 ESSENTIAL (PRIMARY) HYPERTENSION: ICD-10-CM

## 2023-10-02 PROCEDURE — 99214 OFFICE O/P EST MOD 30 MIN: CPT

## 2023-10-02 PROCEDURE — 73503 X-RAY EXAM HIP UNI 4/> VIEWS: CPT | Mod: RT

## 2023-10-02 PROCEDURE — 72100 X-RAY EXAM L-S SPINE 2/3 VWS: CPT

## 2023-10-02 RX ORDER — CYCLOBENZAPRINE HYDROCHLORIDE 10 MG/1
10 TABLET, FILM COATED ORAL
Qty: 30 | Refills: 0 | Status: ACTIVE | COMMUNITY
Start: 2023-10-02 | End: 1900-01-01

## 2023-10-02 RX ORDER — MELOXICAM 15 MG/1
15 TABLET ORAL
Qty: 30 | Refills: 1 | Status: ACTIVE | COMMUNITY
Start: 2023-10-02 | End: 1900-01-01

## 2023-11-27 ENCOUNTER — APPOINTMENT (OUTPATIENT)
Dept: ORTHOPEDIC SURGERY | Facility: CLINIC | Age: 63
End: 2023-11-27
Payer: COMMERCIAL

## 2023-11-27 VITALS — WEIGHT: 215 LBS | BODY MASS INDEX: 32.58 KG/M2 | HEIGHT: 68 IN

## 2023-11-27 DIAGNOSIS — M17.12 UNILATERAL PRIMARY OSTEOARTHRITIS, LEFT KNEE: ICD-10-CM

## 2023-11-27 PROCEDURE — 20610 DRAIN/INJ JOINT/BURSA W/O US: CPT | Mod: LT

## 2023-11-27 PROCEDURE — J3490M: CUSTOM

## 2023-11-27 PROCEDURE — 99214 OFFICE O/P EST MOD 30 MIN: CPT | Mod: 25

## 2024-06-10 NOTE — ASSESSMENT
L/M for pt to schedule appt and then ask for care team to get routed to Dr. Samuels for limited refills until appt time.      [FreeTextEntry1] : ***s/p L knee PMM/PLM on 5/21/21 **\par (**has some OA**)\par \par Left X-Ray Examination of the KNEE (4 views): medial and patellofemoral degenerate changes.\par \par  exacb underlying OA\par \par - We discussed their diagnosis and treatment options at length including the risks and benefits of both surgical treatment with a knee replacement and non-surgical options.\par - We will continue conservative treatment with activity modification, PT, icing, weight loss, and anti-inflammatory medications.\par - The patient was provided with a PT prescription to work on ROM, hip ER/abductors strengthening, quad/hamstring stretches and strengthening, and other exercises \par - The patient was advised to let pain guide the gradual advancement of activities. \par - We also discussed the possible of a corticosteroid injection in order to help decrease inflammation and pain so that they can perform better therapy.\par - The risks, benefits, and alternatives to corticosteroid injection were reviewed with the patient and they wished to proceed with this treatment course. \par - Follow up as needed in 6 weeks to re-evaluate, if no improvement we spoke about possibility of viscosupplementation injections - will subimit for visco \par

## 2025-03-10 ENCOUNTER — APPOINTMENT (OUTPATIENT)
Dept: CARDIOLOGY | Facility: CLINIC | Age: 65
End: 2025-03-10
Payer: COMMERCIAL

## 2025-03-10 ENCOUNTER — NON-APPOINTMENT (OUTPATIENT)
Age: 65
End: 2025-03-10

## 2025-03-10 VITALS
BODY MASS INDEX: 33.8 KG/M2 | SYSTOLIC BLOOD PRESSURE: 148 MMHG | WEIGHT: 223 LBS | HEART RATE: 68 BPM | HEIGHT: 68 IN | OXYGEN SATURATION: 98 % | DIASTOLIC BLOOD PRESSURE: 95 MMHG

## 2025-03-10 DIAGNOSIS — I25.10 ATHEROSCLEROTIC HEART DISEASE OF NATIVE CORONARY ARTERY W/OUT ANGINA PECTORIS: ICD-10-CM

## 2025-03-10 DIAGNOSIS — R00.2 PALPITATIONS: ICD-10-CM

## 2025-03-10 DIAGNOSIS — E78.5 HYPERLIPIDEMIA, UNSPECIFIED: ICD-10-CM

## 2025-03-10 DIAGNOSIS — I10 ESSENTIAL (PRIMARY) HYPERTENSION: ICD-10-CM

## 2025-03-10 PROCEDURE — 99204 OFFICE O/P NEW MOD 45 MIN: CPT

## 2025-03-10 PROCEDURE — 93000 ELECTROCARDIOGRAM COMPLETE: CPT

## 2025-03-10 PROCEDURE — G2211 COMPLEX E/M VISIT ADD ON: CPT | Mod: NC

## 2025-03-10 RX ORDER — ASPIRIN 81 MG/1
81 TABLET ORAL
Qty: 90 | Refills: 3 | Status: ACTIVE | COMMUNITY
Start: 2025-03-10

## 2025-03-10 RX ORDER — AMLODIPINE AND OLMESARTAN MEDOXOMIL 5; 40 MG/1; MG/1
5-40 TABLET ORAL DAILY
Qty: 90 | Refills: 3 | Status: ACTIVE | COMMUNITY
Start: 2025-03-10 | End: 1900-01-01

## 2025-05-13 ENCOUNTER — APPOINTMENT (OUTPATIENT)
Dept: CARDIOLOGY | Facility: CLINIC | Age: 65
End: 2025-05-13
Payer: COMMERCIAL

## 2025-05-13 VITALS
HEIGHT: 68 IN | DIASTOLIC BLOOD PRESSURE: 88 MMHG | HEART RATE: 67 BPM | BODY MASS INDEX: 32.74 KG/M2 | OXYGEN SATURATION: 98 % | SYSTOLIC BLOOD PRESSURE: 139 MMHG | WEIGHT: 216 LBS

## 2025-05-13 DIAGNOSIS — I25.10 ATHEROSCLEROTIC HEART DISEASE OF NATIVE CORONARY ARTERY W/OUT ANGINA PECTORIS: ICD-10-CM

## 2025-05-13 DIAGNOSIS — I10 ESSENTIAL (PRIMARY) HYPERTENSION: ICD-10-CM

## 2025-05-13 DIAGNOSIS — E78.5 HYPERLIPIDEMIA, UNSPECIFIED: ICD-10-CM

## 2025-05-13 PROCEDURE — G2211 COMPLEX E/M VISIT ADD ON: CPT | Mod: NC

## 2025-05-13 PROCEDURE — 93000 ELECTROCARDIOGRAM COMPLETE: CPT

## 2025-05-13 PROCEDURE — 99215 OFFICE O/P EST HI 40 MIN: CPT

## 2025-05-13 RX ORDER — ROSUVASTATIN CALCIUM 20 MG/1
20 TABLET, FILM COATED ORAL
Qty: 90 | Refills: 3 | Status: ACTIVE | COMMUNITY
Start: 2025-05-13 | End: 1900-01-01